# Patient Record
Sex: FEMALE | Race: WHITE | Employment: FULL TIME | URBAN - METROPOLITAN AREA
[De-identification: names, ages, dates, MRNs, and addresses within clinical notes are randomized per-mention and may not be internally consistent; named-entity substitution may affect disease eponyms.]

---

## 2017-12-14 ENCOUNTER — OFFICE VISIT (OUTPATIENT)
Dept: INTERNAL MEDICINE CLINIC | Age: 57
End: 2017-12-14

## 2017-12-14 VITALS
WEIGHT: 152.4 LBS | TEMPERATURE: 98.1 F | HEIGHT: 63 IN | RESPIRATION RATE: 16 BRPM | SYSTOLIC BLOOD PRESSURE: 121 MMHG | OXYGEN SATURATION: 96 % | DIASTOLIC BLOOD PRESSURE: 82 MMHG | HEART RATE: 84 BPM | BODY MASS INDEX: 27 KG/M2

## 2017-12-14 DIAGNOSIS — N95.2 VAGINAL ATROPHY: ICD-10-CM

## 2017-12-14 DIAGNOSIS — M54.50 CHRONIC LEFT-SIDED LOW BACK PAIN WITHOUT SCIATICA: ICD-10-CM

## 2017-12-14 DIAGNOSIS — Z88.9 ATOPY: ICD-10-CM

## 2017-12-14 DIAGNOSIS — G89.29 CHRONIC LEFT-SIDED LOW BACK PAIN WITHOUT SCIATICA: ICD-10-CM

## 2017-12-14 DIAGNOSIS — Z00.00 WELL ADULT EXAM: Primary | ICD-10-CM

## 2017-12-14 DIAGNOSIS — Z12.83 SKIN CANCER SCREENING: ICD-10-CM

## 2017-12-14 RX ORDER — METHOCARBAMOL 500 MG/1
500 TABLET, FILM COATED ORAL
Qty: 30 TAB | Refills: 0 | Status: SHIPPED | OUTPATIENT
Start: 2017-12-14

## 2017-12-14 NOTE — PROGRESS NOTES
Carolee Nicholson is a 62 y.o. female and presents with . 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 She has seen PT. She reports getting frequent low back pain and sciatic nerve issues. She has occasional knumbness on the side of her left leg. Cramp in glute and perianal.    She is taking ibuprofen 400 mg, bid, 3 days/week  Sx are getting worse and advil is helping her. She noticed when standing  Heating pad    Uri  A few times last year      Allergies  Taking otc zyrtec  Does not do flonase            Review of Systems  Constitutional: negative for fevers, chills, anorexia and weight loss  Eyes:   negative for visual disturbance and irritation  ENT:   negative for tinnitus,sore throat,nasal congestion,ear pains. hoarseness  Respiratory:  negative for cough, hemoptysis, dyspnea,wheezing  CV:   negative for chest pain, palpitations, lower extremity edema  GI:   negative for nausea, vomiting, diarrhea, abdominal pain,melena  Endo:               negative for polyuria,polydipsia,polyphagia,heat intolerance  Genitourinary: negative for frequency, dysuria and hematuria  Integument:  negative for rash and pruritus  Hematologic:  negative for easy bruising and gum/nose bleeding  Musculoskel: negative for myalgias, arthralgias, back pain, muscle weakness, joint pain  Neurological:  negative for headaches, dizziness, vertigo, memory problems and gait   Behavl/Psych: negative for feelings of anxiety, depression, mood changes    Past Medical History:   Diagnosis Date    Headache     seasonal allergies, migraines     Past Surgical History:   Procedure Laterality Date    HX COLONOSCOPY      5 years     HX GYN      arias 2 weeks ago      Social History     Social History    Marital status: UNKNOWN     Spouse name: N/A    Number of children: N/A    Years of education: N/A Social History Main Topics    Smoking status: Never Smoker    Smokeless tobacco: Never Used    Alcohol use 0.0 oz/week     0 Standard drinks or equivalent per week    Drug use: No    Sexual activity: Yes     Partners: Male     Other Topics Concern    None     Social History Narrative    Full time Pharmacology program PHD, DVM    Manageable stress        No children         Family History   Problem Relation Age of Onset    Cancer Father        renal/precancerous polyps     Current Outpatient Prescriptions   Medication Sig Dispense Refill    ibuprofen (MOTRIN) 200 mg tablet Take  by mouth.  aspirin delayed-release 81 mg tablet Take  by mouth daily.  conjugated estrogens (PREMARIN) 0.625 mg/gram vaginal cream Use small pea sized amount to vaginal introitus daily 15 g 1     No Known Allergies    Objective:  Visit Vitals    /82 (BP 1 Location: Right arm, BP Patient Position: Sitting)    Pulse 84    Temp 98.1 °F (36.7 °C) (Oral)    Resp 16    Ht 5' 3\" (1.6 m)    Wt 152 lb 6.4 oz (69.1 kg)    SpO2 96%    BMI 27 kg/m2     Physical Exam:   General appearance - alert, well appearing, and in no distress  Mental status - alert, oriented to person, place, and time  EYE-ROMAN, EOMI, corneas normal, no foreign bodies, visual acuity normal both eyes, no periorbital cellulitis  ENT-ENT exam normal, no neck nodes or sinus tenderness  Nose - normal and patent, no erythema, discharge or polyps  Mouth - mucous membranes moist, pharynx normal without lesions  Neck - supple, no significant adenopathy   Chest - clear to auscultation, no wheezes, rales or rhonchi, symmetric air entry   Heart - normal rate, regular rhythm, normal S1, S2, no murmurs, rubs, clicks or gallops   Abdomen - soft, nontender, nondistended, no masses or organomegaly  Lymph- no adenopathy palpable  Ext-peripheral pulses normal, no pedal edema, no clubbing or cyanosis  Skin-Warm and dry.  no hyperpigmentation, vitiligo, or suspicious lesions  Neuro -alert, oriented, normal speech, no focal findings or movement disorder noted  Neck-normal C-spine, no tenderness, full ROM without pain      Results for orders placed or performed in visit on 02/80/68   METABOLIC PANEL, COMPREHENSIVE   Result Value Ref Range    Glucose 89 65 - 99 mg/dL    BUN 10 6 - 24 mg/dL    Creatinine 0.90 0.57 - 1.00 mg/dL    GFR est non-AA 72 >59 mL/min/1.73    GFR est AA 83 >59 mL/min/1.73    BUN/Creatinine ratio 11 9 - 23    Sodium 141 134 - 144 mmol/L    Potassium 4.5 3.5 - 5.2 mmol/L    Chloride 100 97 - 108 mmol/L    CO2 27 18 - 29 mmol/L    Calcium 10.2 8.7 - 10.2 mg/dL    Protein, total 6.9 6.0 - 8.5 g/dL    Albumin 4.4 3.5 - 5.5 g/dL    GLOBULIN, TOTAL 2.5 1.5 - 4.5 g/dL    A-G Ratio 1.8 1.1 - 2.5    Bilirubin, total 0.4 0.0 - 1.2 mg/dL    Alk.  phosphatase 99 39 - 117 IU/L    AST (SGOT) 17 0 - 40 IU/L    ALT (SGPT) 20 0 - 32 IU/L   LIPID PANEL   Result Value Ref Range    Cholesterol, total 209 (H) 100 - 199 mg/dL    Triglyceride 100 0 - 149 mg/dL    HDL Cholesterol 55 >39 mg/dL    VLDL, calculated 20 5 - 40 mg/dL    LDL, calculated 134 (H) 0 - 99 mg/dL   VITAMIN D, 25 HYDROXY   Result Value Ref Range    VITAMIN D, 25-HYDROXY 25.5 (L) 30.0 - 100.0 ng/mL   TSH, 3RD GENERATION   Result Value Ref Range    TSH 1.750 0.450 - 4.500 uIU/mL   CVD REPORT   Result Value Ref Range    INTERPRETATION Note      Prevention    Cardiovascular profile  Family hx  Exercising:  Walking, will restart PT  Blood pressure:  Health healthy diet:  Diabetes:  Cholesterol:  Renal function:      Cancer risk profile  Mammogram 2 weeks 9/2017  Lung allergies  Colonoscopy 7 years ago, needs repeat colonscopy, + colon cancer famly  Skin nonhealing in 2 weeks   Gyn abnormal bleeding/discharge/abd pain/pressure 3 years 2016 wnl      Thyroid sx    Osteopenia prevention  Calcium 1000mg/day yes  Vitamin D 800iu/day yes    Mental health scale: MH good  Depression  Anxiety  Sleep # of hours:  Energy Level:        Immunizations  TDAP within the month 11/15  Pneumonia vaccine  Flu vaccine  Shingles vaccine  HPV        Diagnoses and all orders for this visit:    1. 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 Vaginal atrophy  -     conjugated estrogens (PREMARIN) 0.625 mg/gram vaginal cream; Use small pea sized amount to vaginal introitus daily    3. Chronic left-sided low back pain without sciatica  -     REFERRAL TO PHYSICAL THERAPY  -     methocarbamol (ROBAXIN) 500 mg tablet; Take 1 Tab by mouth nightly. Caution may cause sedation    4. Skin cancer screening  -     REFERRAL TO DERMATOLOGY    5. Atopy  -     REFERRAL TO ALLERGY  This note will not be viewable in MyChart.

## 2017-12-14 NOTE — MR AVS SNAPSHOT
Visit Information Date & Time Provider Department Dept. Phone Encounter #  
 12/14/2017  1:00 PM Stephane Snyder MD [facility] [address] [id number] Upcoming Health Maintenance Date Due Hepatitis C Screening 1960 FOBT Q 1 YEAR AGE 50-75 8/27/2010 PAP AKA CERVICAL CYTOLOGY 6/20/2019 DTaP/Tdap/Td series (2 - Td) 12/14/2027 Allergies as of 12/14/2017  Review Complete On: 12/14/2017 By: Stephane Snyder MD  
 No Known Allergies Current Immunizations  Never Reviewed No immunizations on file. Not reviewed this visit You Were Diagnosed With   
  
 Codes Comments Well adult exam    -  Primary ICD-10-CM: Z00.00 ICD-9-CM: V70.0 Vaginal atrophy     ICD-10-CM: N95.2 ICD-9-CM: 574. 3 Chronic left-sided low back pain without sciatica     ICD-10-CM: M54.5, G89.29 ICD-9-CM: 724.2, 338.29 Skin cancer screening     ICD-10-CM: Z12.83 ICD-9-CM: V76.43 Atopy     ICD-10-CM: Z88.9 ICD-9-CM: V15.09 Vitals BP Pulse Temp Resp Height(growth percentile) Weight(growth percentile) 121/82 (BP 1 Location: Right arm, BP Patient Position: Sitting) 84 98.1 °F (36.7 °C) (Oral) 16 5' 3\" (1.6 m) 152 lb 6.4 oz (69.1 kg) SpO2 BMI OB Status Smoking Status 96% 27 kg/m2 Postmenopausal Never Smoker BMI and BSA Data Body Mass Index Body Surface Area  
 27 kg/m 2 1.75 m 2 Preferred Pharmacy Pharmacy Name Phone [facility] - [address], [facility] [phone] Your Updated Medication List  
  
   
This list is accurate as of: 12/14/17  2:07 PM.  Always use your most recent med list.  
  
  
  
  
 conjugated estrogens 0.625 mg/gram vaginal cream  
Commonly known as:  PREMARIN Use small pea sized amount to vaginal introitus daily  
  
 ibuprofen 200 mg tablet Commonly known as:  MOTRIN Take  by mouth. methocarbamol 500 mg tablet Commonly known as:  ROBAXIN Take 1 Tab by mouth nightly. Caution may cause sedation Prescriptions Printed Refills  
 methocarbamol (ROBAXIN) 500 mg tablet 0 Sig: Take 1 Tab by mouth nightly. Caution may cause sedation Class: Print Route: Oral  
  
Prescriptions Sent to Pharmacy Refills  
 conjugated estrogens (PREMARIN) 0.625 mg/gram vaginal cream 1 Sig: Use small pea sized amount to vaginal introitus daily Class: Normal  
 Pharmacy: [address] #: [phone] We Performed the Following CBC W/O DIFF [53876 CPT(R)] HEMOGLOBIN A1C WITH EAG [81036 CPT(R)] HEPATITIS C AB [47071 CPT(R)] LIPID PANEL [92308 CPT(R)] METABOLIC PANEL, COMPREHENSIVE [22115 CPT(R)] REFERRAL FOR COLONOSCOPY [JOA176 Custom] Comments:  
 Please evaluate patient for screening coloncopy.  + family hx of colon cancer: father. REFERRAL TO ALLERGY [REF5 Custom] REFERRAL TO DERMATOLOGY [REF19 Custom] REFERRAL TO PHYSICAL THERAPY [NZV43 Custom] Comments:  
 l5 radiculopathy and possible s3 involvement TSH 3RD GENERATION [78172 CPT(R)] VITAMIN D, 25 HYDROXY A522948 CPT(R)] Referral Information Referral ID Referred By Referred To  
  
 [id number] Sánchez Welsh, PT   
   [address] [facility] [address] Phone: [phone] Fax: [phone] Visits Status Start Date End Date 1 New Request 12/14/17 12/14/18 If your referral has a status of pending review or denied, additional information will be sent to support the outcome of this decision. Referral ID Referred By Referred To  
 [id number] LASHAY, [facility]  
   [address] [facility] [address]  [address] Visits Status Start Date End Date 1 New Request 12/14/17 12/14/18 If your referral has a status of pending review or denied, additional information will be sent to support the outcome of this decision. Referral ID Referred By Referred To  
 [id number] Phil Cheema MD  
   [id number] [facility] [address], [facility] [employer] Phone: [phone] Fax: [phone] Visits Status Start Date End Date 1 New Request 12/14/17 12/14/18 If your referral has a status of pending review or denied, additional information will be sent to support the outcome of this decision. Referral ID Referred By Referred To  
 [id number] [facility] [address]  
   [place] [address] [place] [address], [address] [place] [address] Visits Status Start Date End Date 1 New Request 12/14/17 12/14/18 If your referral has a status of pending review or denied, additional information will be sent to support the outcome of this decision. Introducing [facility] [place] [facility]! Benja Avendano introduces Pluralsight patient portal. Now you can access parts of your medical record, email your doctor's office, and request medication refills online. 1. In your internet browser, go to [email or web address] 2. Click on the First Time User? Click Here link in the Sign In box. You will see the New Member Sign Up page. 3. Enter your Pluralsight Access Code exactly as it appears below. You will not need to use this code after youve completed the sign-up process. If you do not sign up before the expiration date, you must request a new code. · Pluralsight Access Code: [email or web address] Expires: 3/14/2018  2:06 PM 
 
4. Enter the last four digits of your Social Security Number (xxxx) and Date of Birth (mm/dd/yyyy) as indicated and click Submit. You will be taken to the next sign-up page. 5. Create a Pluralsight ID.  This will be your Pluralsight login ID and cannot be changed, so think of one that is secure and easy to remember. 6. Create a Somewhere password. You can change your password at any time. 7. Enter your Password Reset Question and Answer. This can be used at a later time if you forget your password. 8. Enter your e-mail address. You will receive e-mail notification when new information is available in [address] 9. Click Sign Up. You can now view and download portions of your medical record. 10. Click the Download Summary menu link to download a portable copy of your medical information. If you have questions, please visit the Frequently Asked Questions section of the Somewhere website. Remember, Somewhere is NOT to be used for urgent needs. For medical emergencies, dial 911. Now available from your iPhone and Android! Please provide this summary of care documentation to your next provider. If you have any questions after today's visit, please call [phone].

## 2017-12-14 NOTE — PROGRESS NOTES
Eliud Sabillon is a 62 y.o. female  1. Have you been to the ER, urgent care clinic since your last visit? Hospitalized since your last visit?no    2. Have you seen or consulted any other health care providers outside of the [address] [place] [address] since your last visit? Include any pap smears or colon screening.  no

## 2017-12-15 LAB
25(OH)D3+25(OH)D2 SERPL-MCNC: 10.2 NG/ML (ref 30–100)
ALBUMIN SERPL-MCNC: 4.7 G/DL (ref 3.5–5.5)
ALBUMIN/GLOB SERPL: 1.7 {RATIO} (ref 1.2–2.2)
ALP SERPL-CCNC: 98 IU/L (ref 39–117)
ALT SERPL-CCNC: 28 IU/L (ref 0–32)
AST SERPL-CCNC: 20 IU/L (ref 0–40)
BILIRUB SERPL-MCNC: 0.4 MG/DL (ref 0–1.2)
BUN SERPL-MCNC: 12 MG/DL (ref 6–24)
BUN/CREAT SERPL: 14 (ref 9–23)
CALCIUM SERPL-MCNC: 10 MG/DL (ref 8.7–10.2)
CHLORIDE SERPL-SCNC: 101 MMOL/L (ref 96–106)
CHOLEST SERPL-MCNC: 218 MG/DL (ref 100–199)
CO2 SERPL-SCNC: 26 MMOL/L (ref 18–29)
CREAT SERPL-MCNC: 0.87 MG/DL (ref 0.57–1)
ERYTHROCYTE [DISTWIDTH] IN BLOOD BY AUTOMATED COUNT: 12.8 % (ref 12.3–15.4)
EST. AVERAGE GLUCOSE BLD GHB EST-MCNC: 111 MG/DL
GFR SERPLBLD CREATININE-BSD FMLA CKD-EPI: 74 ML/MIN/1.73
GFR SERPLBLD CREATININE-BSD FMLA CKD-EPI: 86 ML/MIN/1.73
GLOBULIN SER CALC-MCNC: 2.8 G/DL (ref 1.5–4.5)
GLUCOSE SERPL-MCNC: 95 MG/DL (ref 65–99)
HBA1C MFR BLD: 5.5 % (ref 4.8–5.6)
HCT VFR BLD AUTO: 45.3 % (ref 34–46.6)
HCV AB S/CO SERPL IA: <0.1 S/CO RATIO (ref 0–0.9)
HDLC SERPL-MCNC: 52 MG/DL
HGB BLD-MCNC: 14.5 G/DL (ref 11.1–15.9)
INTERPRETATION, 910389: NORMAL
LDLC SERPL CALC-MCNC: 146 MG/DL (ref 0–99)
MCH RBC QN AUTO: 30.7 PG (ref 26.6–33)
MCHC RBC AUTO-ENTMCNC: 32 G/DL (ref 31.5–35.7)
MCV RBC AUTO: 96 FL (ref 79–97)
PLATELET # BLD AUTO: 278 X10E3/UL (ref 150–379)
POTASSIUM SERPL-SCNC: 4.4 MMOL/L (ref 3.5–5.2)
PROT SERPL-MCNC: 7.5 G/DL (ref 6–8.5)
RBC # BLD AUTO: 4.73 X10E6/UL (ref 3.77–5.28)
SODIUM SERPL-SCNC: 143 MMOL/L (ref 134–144)
TRIGL SERPL-MCNC: 98 MG/DL (ref 0–149)
TSH SERPL DL<=0.005 MIU/L-ACNC: 1.78 UIU/ML (ref 0.45–4.5)
VLDLC SERPL CALC-MCNC: 20 MG/DL (ref 5–40)
WBC # BLD AUTO: 6.4 X10E3/UL (ref 3.4–10.8)

## 2018-02-05 ENCOUNTER — HOSPITAL ENCOUNTER (OUTPATIENT)
Dept: PHYSICAL THERAPY | Age: 58
Discharge: HOME | End: 2018-02-05
Payer: COMMERCIAL

## 2018-02-05 PROCEDURE — 97110 THERAPEUTIC EXERCISES: CPT | Performed by: PHYSICAL THERAPIST

## 2018-02-05 PROCEDURE — 97161 PT EVAL LOW COMPLEX 20 MIN: CPT | Performed by: PHYSICAL THERAPIST

## 2018-02-05 NOTE — PROGRESS NOTES
PT INITIAL EVALUATION NOTE 2-15    Patient Name: Maye Bill  Date:2018  : 1960  [x]  Patient  Verified  Payor: Brian Carranza / Plan: [address] [place] [address] / Product Type: PPO /    In time:12:00 pm  Out time:1:00 pm  Total Treatment Time (min): 60  Visit #: 1     Treatment Area: Low back pain [M54.5]  Other chronic pain [G89.29]    SUBJECTIVE  Pain Level (0-10 scale): 2/10  Any medication changes, allergies to medications, adverse drug reactions, diagnosis change, or new procedure performed?: [] No    [x] Yes (see summary sheet for update)  Subjective:     Pt reports that she has been having up and lower back pain for about 25 years. She reports that she has been having low grade discomfort for a long time and she reports that she can just move weird and the pain would be set off.   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, however 15-20 is standing and moving around  Living Situation: with   Pt Goals: to develop exercise to manage pain and decrease reliance on NSAIDS and ability to wear heels  Barriers: duration of symptoms  Motivation: excellent  Substance use: none stated   FABQ Score: see scanned into chart  Cognition: A & O x 4        OBJECTIVE/EXAMINATION    Posture:  Hyperextended lumbar spine in seated  Other Observations:  Pt is short in stature  Gait and Functional Mobility:  Limited trunk rotation and limited UE swing  Palpation: bilateral levator scap tightness, hypertonicity bilateral QL        Lumbar AROM:  Cervical AROM:        R  L  R  L  Flexion    WFL    48 with back tightness and thoracic pn    Extension   pn at 50%   29 with stretch    Side Bending   WFL  WFL  38  30 both sides with mm tightness  Rotation   75% pn! 50%  63 pn!  41      LOWER QUARTER   MUSCLE STRENGTH  KEY       R  L  0 - No Contraction  L1, L2 Psoas  5  5  1 - Trace   L3 Quads  5  5  2 - Poor   L4 Tib Ant  5  5  3 - Fair    L5 EHL  5  5  4 - Good   S1 FHL  5  5  5 - Normal   S2 Hams  5  5    UPPER QUARTER   MUSCLE STRENGTH  KEY       R  L  0 - No Contraction  C1, C2 Neck Flex 5  5  1 - Trace   C3 Side Flex  5  5  2 - Poor   C4 Sh Elev  5  5  3 - Fair    C5 Deltoid/Biceps 5  5  4 - Good   C6 Wrist Ext  5  5  5 - Normal   C7 Triceps  5  5      C8 Thumb Ext  5  5      T1 Hand Inst  5  5          Neurological: Reflexes / Sensations: bilateral UE and LE WNL  Special Tests:    Min+ bilateral ulnar nerve, + Median bilaterally L > R, min + bilateral radial nerve R > L   Forward Bend: + for stiffness in low back       Slump: + bilateral LE at full knee extension         15 min Therapeutic Exercise:  [x] See flow sheet :   Rationale: increase ROM, increase strength and improve coordination to improve the patients ability to return to N ADL and IADL skills              With   [x] TE   [] TA   [] neuro   [] other: Patient Education: [x] Review HEP    [] Progressed/Changed HEP based on:   [] positioning   [] body mechanics   [] transfers   [] heat/ice application    [] other:        Other Objective/Functional Measures:see FOTO scanned into chart    Pain Level (0-10 scale) post treatment: about the same      ASSESSMENT:      [x]  See Plan of Care      Madhavi Ferreira PT, DPT, [facility] 2/5/2018  12:03 PM

## 2018-02-05 NOTE — PROGRESS NOTES
[facility]  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 Assessment/ tatum information: Joy Singer presents to our office today with cervical and lumbar radiculopathy, postural dysfunction and limited ADL and IADL skills. She will benefit from skilled PT prior to D/C to address the below and allow return to N ADL skills without compensation or without symptoms present    Evaluation Complexity History LOW Complexity : Zero comorbidities / personal factors that will impact the outcome / POC; Examination MEDIUM Complexity : 3 Standardized tests and measures addressing body structure, function, activity limitation and / or participation in recreation  ;Presentation MEDIUM Complexity : Evolving with changing characteristics  ; Clinical Decision Making MEDIUM Complexity : FOTO score of 26-74  Overall Complexity Rating: LOW     Problem List: pain affecting function, decrease ROM, decrease strength, impaired gait/ balance, decrease ADL/ functional abilitiies, decrease activity tolerance, decrease flexibility/ joint mobility and decrease transfer abilities   Treatment Plan may include any combination of the following: Therapeutic exercise, Therapeutic activities, Neuromuscular re-education, Physical agent/modality, Manual therapy, Patient education, Self Care training and Functional mobility training  Patient / Family readiness to learn indicated by: asking questions, trying to perform skills and interest  Persons(s) to be included in education: patient (P)  Barriers to Learning/Limitations: None  Patient Goal (s): to be able to control this better myself  Patient Self Reported Health Status: good  Rehabilitation Potential: good    Short Term Goals: To be accomplished in 3 weeks:  Pt will demo independence with HEP with no v.c. Pt will demo all transfers with no compensation and no pain present  Pt will demo all neural gliding positions without symptom provocation to allow for ADL performance    Long Term Goals: To be accomplished in 8-12 weeks:  1) Patient will demonstrate Negative Hruska Adduction Drop Test   2) Patient will demonstrate independence with HEP  3) Patient will demonstrate greater than Grade II on Hruska Adduction Lift Test  LongTerm TAYLA Goals  1)Patient will demonstrate Grade IV or Grade V Hruska Adduction Lift Score to allow for functional mobility in home and community settings  2)Pt will demonstrate the ability to ambulate forward and backward achieving bilateral Acetabular Femoral Internal Rotation for pain free mobility  3)Pt will demonstrate the ability to walk without subjective complaints or gait deviation  4)Pt will demonstrate independence with discharge HEP to allow for ambulation, sitting and standing without objective dysfunction    Frequency / Duration: Patient to be seen 1-2 times per week for 8-12 treatments.     Patient/ Caregiver education and instruction: self care, activity modification and exercises    [x]  Plan of care has been reviewed with KWAME Guzman, PT, DPT, [facility] 2/5/2018 12:03 PM    ________________________________________________________________________    I certify that the above Therapy Services are being furnished while the patient is under my care. I agree with the treatment plan and certify that this therapy is necessary.     [de-identified] Signature:____________________  Date:____________Time: _________

## 2018-02-12 ENCOUNTER — HOSPITAL ENCOUNTER (OUTPATIENT)
Dept: PHYSICAL THERAPY | Age: 58
Discharge: HOME | End: 2018-02-12
Payer: COMMERCIAL

## 2018-02-12 PROCEDURE — 97112 NEUROMUSCULAR REEDUCATION: CPT | Performed by: PHYSICAL THERAPIST

## 2018-02-12 PROCEDURE — 97140 MANUAL THERAPY 1/> REGIONS: CPT | Performed by: PHYSICAL THERAPIST

## 2018-02-12 PROCEDURE — 97014 ELECTRIC STIMULATION THERAPY: CPT | Performed by: PHYSICAL THERAPIST

## 2018-02-12 NOTE — PROGRESS NOTES
PT DAILY TREATMENT NOTE 2-15    Patient Name: Mike Tesfaye  Date:2018  : 1960  [x]  Patient  Verified  Payor: Uma Durbin / Plan: [address] [place] [address] / Product Type: PPO /    In time:2:30 pm  Out time:3:30 pm  Total Treatment Time (min): 60  Visit #: 2     Treatment Area: Low back pain [M54.5]  Other chronic pain [G89.29]    SUBJECTIVE  Pain Level (0-10 scale): 4/10  Any medication changes, allergies to medications, adverse drug reactions, diagnosis change, or new procedure performed?: [x] No    [] Yes (see summary sheet for update)  Subjective functional status/changes:   [] No changes reported  The exercises really seem to be increasing my symptoms.     OBJECTIVE    Modality rationale: decrease pain and increase tissue extensibility to improve the patients ability to return to N ADL skills   Min Type Additional Details   15 [x] Estim: []Att   [x]Unatt        []TENS instruct                  []IFC  [x]Premod   []NMES                     []Other:  []w/US   []w/ice   [x]w/heat  Position: supine  Location: neck and back    []  Traction: [] Cervical       []Lumbar                       [] Prone          []Supine                       []Intermittent   []Continuous Lbs:  [] before manual  [] after manual  []w/heat    []  Ultrasound: []Continuous   [] Pulsed at:                            []1MHz   []3MHz Location:  W/cm2:    []  Paraffin         Location:  []w/heat    []  Ice     []  Heat  []  Ice massage Position:  Location:    []  Laser  []  Other: Position:  Location:    []  Vasopneumatic Device Pressure:       [] lo [] med [] hi   Temperature:    [x] Skin assessment post-treatment:  [x]intact [x]redness- no adverse reaction    []redness  adverse reaction:        40 min Neuromuscular Re-education:  [x]  See flow sheet :   Rationale: improve coordination, improve balance and increase proprioception  to improve the patients ability to return to N ADL skills     min Manual Therapy: Rationale: -  to improve the patients ability to -              With   [x] TE   [] TA   [] neuro   [] other: Patient Education: [x] Review HEP    [x] Progressed/Changed HEP based on:   [] positioning   [] body mechanics   [] transfers   [] heat/ice application    [] other:      Other Objective/Functional Measures: pt is PEC patterning per [facility] evaluation information     Pain Level (0-10 scale) post treatment: 2/10    ASSESSMENT/Changes in Function:     Patient will continue to benefit from skilled PT services to modify and progress therapeutic interventions, address functional mobility deficits, address ROM deficits, address strength deficits, analyze and address soft tissue restrictions, analyze and cue movement patterns, analyze and modify body mechanics/ergonomics, assess and modify postural abnormalities, address imbalance/dizziness and instruct in home and community integration to attain remaining goals. [x]  See Plan of Care  []  See progress note/recertification  []  See Discharge Summary         Progress towards goals / Updated goals:  Pt did not tolerate initial HEP well. Pt will hopefully tolerate new HEP with decreased pain present.     PLAN  []  Upgrade activities as tolerated     [x]  Continue plan of care  [x]  Update interventions per flow sheet       []  Discharge due to:_  []  Other:_      Sanam Guzman, PT, DPT, [facility]   2/12/2018  2:59 PM

## 2018-02-15 ENCOUNTER — APPOINTMENT (OUTPATIENT)
Dept: PHYSICAL THERAPY | Age: 58
End: 2018-02-15
Payer: COMMERCIAL

## 2018-02-28 ENCOUNTER — HOSPITAL ENCOUNTER (OUTPATIENT)
Dept: PHYSICAL THERAPY | Age: 58
Discharge: HOME | End: 2018-02-28
Payer: COMMERCIAL

## 2018-02-28 PROCEDURE — 97530 THERAPEUTIC ACTIVITIES: CPT | Performed by: PHYSICAL THERAPIST

## 2018-02-28 PROCEDURE — 97112 NEUROMUSCULAR REEDUCATION: CPT | Performed by: PHYSICAL THERAPIST

## 2018-02-28 PROCEDURE — 97014 ELECTRIC STIMULATION THERAPY: CPT | Performed by: PHYSICAL THERAPIST

## 2018-02-28 NOTE — PROGRESS NOTES
PT DAILY TREATMENT NOTE 2-15    Patient Name: Kofi Nathan  Date:2018  : 1960  [x]  Patient  Verified  Payor: Ajith De Luna / Plan: [address] [place] [address] / Product Type: PPO /    In time:3:00 pm  Out time: 4:00 pm  Total Treatment Time (min): 60  Visit #: 3     Treatment Area: Low back pain [M54.5]  Other chronic pain [G89.29]    SUBJECTIVE  Pain Level (0-10 scale): 3-4/10  Any medication changes, allergies to medications, adverse drug reactions, diagnosis change, or new procedure performed?: [x] No    [] Yes (see summary sheet for update)  Subjective functional status/changes:   [] No changes reported  Pt reports that she has only done her exercises a few times since her last visit. She reports that she is too busy with work.     OBJECTIVE    Modality rationale: decrease pain and increase tissue extensibility to improve the patients ability to return to N ADL skills   Min Type Additional Details   15 [x] Estim: []Att   [x]Unatt        []TENS instruct                  []IFC  [x]Premod   []NMES                     []Other:  []w/US   []w/ice   [x]w/heat  Position: supine  Location: neck and back    []  Traction: [] Cervical       []Lumbar                       [] Prone          []Supine                       []Intermittent   []Continuous Lbs:  [] before manual  [] after manual  []w/heat    []  Ultrasound: []Continuous   [] Pulsed at:                            []1MHz   []3MHz Location:  W/cm2:    []  Paraffin         Location:  []w/heat    []  Ice     []  Heat  []  Ice massage Position:  Location:    []  Laser  []  Other: Position:  Location:    []  Vasopneumatic Device Pressure:       [] lo [] med [] hi   Temperature:    [x] Skin assessment post-treatment:  [x]intact [x]redness- no adverse reaction    []redness  adverse reaction:        40 min Neuromuscular Re-education:  [x]  See flow sheet :   Rationale: improve coordination, improve balance and increase proprioception  to improve the patients ability to return to N ADL skills    15 min Therapeutic Activity: ergonomics of work station discussed and demonstrated with patient.   See copy in chart     Rationale: increase postural awareness and allow patient to sit without pain when at work  to improve the patients ability to -              With   [x] TE   [] TA   [] neuro   [] other: Patient Education: [x] Review HEP    [x] Progressed/Changed HEP based on:   [] positioning   [] body mechanics   [] transfers   [] heat/ice application    [] other:      Other Objective/Functional Measures: pt is PEC patterning per [facility] evaluation information     Pain Level (0-10 scale) post treatment: 2/10    ASSESSMENT/Changes in Function:      []  See Plan of Care  []  See progress note/recertification  [x]  See Discharge Summary         Progress towards goals / Updated goals:  Pt will be able to cont with current HEP with home program.    PLAN  []  Upgrade activities as tolerated     []  Continue plan of care  []  Update interventions per flow sheet       [x]  Discharge due to: inability to attend secondary to work schedule  []  Other:_        Viviane De La Cruz PT, DPT, [facility]   2/28/2018  2:59 PM

## 2018-02-28 NOTE — ANCILLARY DISCHARGE INSTRUCTIONS
[facility]  [place]  [facility] [place], 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 She will cont with her HEP and will likely return in late May for treatment as her work will be less intensive at that time. Short Term Goals: To be accomplished in 3 weeks:   Pt will demo independence with HEP with no v.c. met  Pt will demo all transfers with no compensation and no pain present met  Pt will demo all neural gliding positions without symptom provocation to allow for ADL performance not met    Long Term Goals:  To be accomplished in 8-12 weeks:  1) Patient will demonstrate Negative Hruska Adduction Drop Test   2) Patient will demonstrate independence with HEP met  3) Patient will demonstrate greater than Grade II on Hruska Adduction Lift Test  LongTerm TAYLA Goals  1)Patient will demonstrate Grade IV or Grade V Hruska Adduction Lift Score to allow for functional mobility in home and community settings  2)Pt will demonstrate the ability to ambulate forward and backward achieving bilateral Acetabular Femoral Internal Rotation for pain free mobility  3)Pt will demonstrate the ability to walk without subjective complaints or gait deviation  4)Pt will demonstrate independence with discharge HEP to allow for ambulation, sitting and standing without objective dysfunction      Lucero Crawford, PT, DPT, [facility] 2/28/2018 12:03 PM    ________________________________________________________________________    I certify that the above Therapy Services are being furnished while the patient is under my care. I agree with the treatment plan and certify that this therapy is necessary.     [de-identified] Signature:____________________  Date:____________Time: _________

## 2022-03-23 ENCOUNTER — OFFICE VISIT (OUTPATIENT)
Dept: SURGERY | Age: 62
End: 2022-03-23
Payer: COMMERCIAL

## 2022-03-23 VITALS
WEIGHT: 142 LBS | DIASTOLIC BLOOD PRESSURE: 72 MMHG | HEIGHT: 63 IN | SYSTOLIC BLOOD PRESSURE: 128 MMHG | BODY MASS INDEX: 25.16 KG/M2

## 2022-03-23 DIAGNOSIS — Z12.31 BREAST CANCER SCREENING BY MAMMOGRAM: ICD-10-CM

## 2022-03-23 DIAGNOSIS — N63.10 BREAST MASS, RIGHT: ICD-10-CM

## 2022-03-23 DIAGNOSIS — N64.4 BREAST PAIN, RIGHT: Primary | ICD-10-CM

## 2022-03-23 PROCEDURE — 99203 OFFICE O/P NEW LOW 30 MIN: CPT | Performed by: SURGERY

## 2022-03-23 PROCEDURE — 76642 ULTRASOUND BREAST LIMITED: CPT | Performed by: SURGERY

## 2022-03-23 NOTE — PATIENT INSTRUCTIONS

## 2022-03-23 NOTE — PROGRESS NOTES
HISTORY OF PRESENT ILLNESS  Chava Porter is a 64 y.o. female. HPI  NEW patient consult  for RIGHT breast swelling and sensitive to the touch or movement. Three weeks ago fell on right side resulting in pain under right breast near rib cage. Family History:   Father colon cancer  maternal grandmother colon cancer   Ages unknown    Mammogram-Last mammo 4yrs ago   Outside facility    Review of Systems   Musculoskeletal: Positive for back pain. All other systems reviewed and are negative.       Physical Exam    ASSESSMENT and PLAN  {ASSESSMENT/PLAN:83234}

## 2022-03-23 NOTE — PROGRESS NOTES
HISTORY OF PRESENT ILLNESS  Navarro Powell is a 64 y.o. female. HPI  NEW patient consult  for RIGHT breast swelling and sensitive to the touch or movement.     Three weeks ago fell on right side resulting in pain under right breast near rib cage.      Family History:   Father colon cancer  maternal grandmother colon cancer   Ages unknown     Mammogram-Last mammo 4yrs ago   Outside facility       Past Medical History:   Diagnosis Date    Headache     seasonal allergies, migraines       Past Surgical History:   Procedure Laterality Date    HX COLONOSCOPY      5 years     HX GYN      arias 2 weeks ago        Social History     Socioeconomic History    Marital status:      Spouse name: Not on file    Number of children: Not on file    Years of education: Not on file    Highest education level: Not on file   Occupational History    Not on file   Tobacco Use    Smoking status: Never Smoker    Smokeless tobacco: Never Used   Substance and Sexual Activity    Alcohol use: Yes     Alcohol/week: 0.0 standard drinks    Drug use: No    Sexual activity: Yes     Partners: Male   Other Topics Concern    Not on file   Social History Narrative    Full time Pharmacology program PHD, DVM            Manageable stress        No children         Social Determinants of Health     Financial Resource Strain:     Difficulty of Paying Living Expenses: Not on file   Food Insecurity:     Worried About Running Out of Food in the Last Year: Not on file    Mary Jo of Food in the Last Year: Not on file   Transportation Needs:     Lack of Transportation (Medical): Not on file    Lack of Transportation (Non-Medical):  Not on file   Physical Activity:     Days of Exercise per Week: Not on file    Minutes of Exercise per Session: Not on file   Stress:     Feeling of Stress : Not on file   Social Connections:     Frequency of Communication with Friends and Family: Not on file    Frequency of Social Gatherings with Friends and Family: Not on file    Attends Mormonism Services: Not on file    Active Member of Clubs or Organizations: Not on file    Attends Club or Organization Meetings: Not on file    Marital Status: Not on file   Intimate Partner Violence:     Fear of Current or Ex-Partner: Not on file    Emotionally Abused: Not on file    Physically Abused: Not on file    Sexually Abused: Not on file   Housing Stability:     Unable to Pay for Housing in the Last Year: Not on file    Number of [place] in the Last Year: Not on file    Unstable Housing in the Last Year: Not on file       Current Outpatient Medications on File Prior to Visit   Medication Sig Dispense Refill    Cetirizine 10 mg cap Take  by mouth.  ibuprofen (MOTRIN) 200 mg tablet Take  by mouth.  methocarbamol (ROBAXIN) 500 mg tablet Take 1 Tab by mouth nightly. Caution may cause sedation (Patient not taking: Reported on 3/23/2022) 30 Tab 0    conjugated estrogens (PREMARIN) 0.625 mg/gram vaginal cream Use small pea sized amount to vaginal introitus daily (Patient not taking: Reported on 3/23/2022) 15 g 1     No current facility-administered medications on file prior to visit. No Known Allergies    OB History    No obstetric history on file. Obstetric Comments   Menarche 15, LMP -, # of children 0, age of 1st delivery -, Hysterectomy/oophorectomy no/no, Breast bx no, history of breast feeding no, BCP yes, Hormone therapy no             ROS  Musculoskeletal: Positive for back pain. All other systems reviewed and are negative. Physical Exam  Exam conducted with a chaperone present. Cardiovascular:      Rate and Rhythm: Normal rate and regular rhythm. Heart sounds: Normal heart sounds. Pulmonary:      Breath sounds: Normal breath sounds. Chest:   Breasts: Breasts are symmetrical.      Right: Tenderness present.  No swelling, bleeding, inverted nipple, mass, nipple discharge, skin change, axillary adenopathy or supraclavicular adenopathy. Left: Normal. No swelling, bleeding, inverted nipple, mass, nipple discharge, skin change, tenderness, axillary adenopathy or supraclavicular adenopathy. Lymphadenopathy:      Cervical:      Right cervical: No superficial, deep or posterior cervical adenopathy. Left cervical: No superficial, deep or posterior cervical adenopathy. Upper Body:      Right upper body: No supraclavicular or axillary adenopathy. Left upper body: No supraclavicular or axillary adenopathy. BREAST ULTRASOUND  Indication: RIGHT breast mass UIQ  Technique: The area was scanned using a high-frequency linear-array near-field transducer  Findings: 5 mm simple cyst  Impression: Benign cyst  Disposition: Discussed aspiration or observation; pt has elected for observation. ASSESSMENT and PLAN    ICD-10-CM ICD-9-CM    1. Breast pain, right  N64.4 611.71    2. Breast mass, right  N63.10 611.72 ION 3D FILIPE W MAMMO BI DX INCL CAD   3. Breast cancer screening by mammogram  Z12.31 V76.12 ION 3D FILIPE W MAMMO BI DX INCL CAD     New patient presents for evaluation of RIGHT breast pain, and is doing well overall. Physical exam today normal. RIGHT breast US visualizes 5mm simple cyst in UIQ. Pt to receive diagnostic mammogram. F/U PRN. This plan was reviewed with the patient and patient agrees. All questions were answered. Total time spent was 40 minutes.     Written by Carlene Abarca, as dictated by Dr. Atha Saint, MD.

## 2022-04-21 ENCOUNTER — HOSPITAL ENCOUNTER (OUTPATIENT)
Dept: MAMMOGRAPHY | Age: 62
Discharge: HOME | End: 2022-04-21
Attending: SURGERY
Payer: COMMERCIAL

## 2022-04-21 DIAGNOSIS — N63.10 BREAST MASS, RIGHT: ICD-10-CM

## 2022-04-21 DIAGNOSIS — Z12.31 BREAST CANCER SCREENING BY MAMMOGRAM: ICD-10-CM

## 2022-04-21 PROCEDURE — 77062 BREAST TOMOSYNTHESIS BI: CPT
